# Patient Record
Sex: MALE | Race: WHITE | Employment: FULL TIME | URBAN - NONMETROPOLITAN AREA
[De-identification: names, ages, dates, MRNs, and addresses within clinical notes are randomized per-mention and may not be internally consistent; named-entity substitution may affect disease eponyms.]

---

## 2021-05-14 PROBLEM — J30.2 SEASONAL ALLERGIES: Status: ACTIVE | Noted: 2021-05-14

## 2023-02-13 ENCOUNTER — HOSPITAL ENCOUNTER (EMERGENCY)
Age: 28
Discharge: HOME | End: 2023-02-13
Payer: COMMERCIAL

## 2023-02-13 VITALS
DIASTOLIC BLOOD PRESSURE: 76 MMHG | OXYGEN SATURATION: 98 % | SYSTOLIC BLOOD PRESSURE: 135 MMHG | HEART RATE: 73 BPM | TEMPERATURE: 99.6 F | RESPIRATION RATE: 16 BRPM

## 2023-02-13 DIAGNOSIS — J02.0 STREP PHARYNGITIS: Primary | ICD-10-CM

## 2023-02-13 LAB — S PYO AG THROAT QL: POSITIVE

## 2023-02-13 PROCEDURE — 99203 OFFICE O/P NEW LOW 30 MIN: CPT

## 2023-02-13 PROCEDURE — 87651 STREP A DNA AMP PROBE: CPT

## 2023-02-13 PROCEDURE — 99213 OFFICE O/P EST LOW 20 MIN: CPT | Performed by: NURSE PRACTITIONER

## 2023-02-13 RX ORDER — AMOXICILLIN 500 MG/1
500 CAPSULE ORAL 2 TIMES DAILY
Qty: 20 CAPSULE | Refills: 0 | Status: SHIPPED | OUTPATIENT
Start: 2023-02-13 | End: 2023-02-23

## 2023-02-13 ASSESSMENT — PAIN DESCRIPTION - LOCATION: LOCATION: THROAT

## 2023-02-13 ASSESSMENT — ENCOUNTER SYMPTOMS
SORE THROAT: 1
RHINORRHEA: 1
NAUSEA: 0
CHEST TIGHTNESS: 0
DIARRHEA: 0
VOMITING: 0
COUGH: 1
SHORTNESS OF BREATH: 0

## 2023-02-13 ASSESSMENT — PAIN - FUNCTIONAL ASSESSMENT: PAIN_FUNCTIONAL_ASSESSMENT: 0-10

## 2023-02-13 ASSESSMENT — PAIN SCALES - GENERAL: PAINLEVEL_OUTOF10: 5

## 2023-02-13 NOTE — ED TRIAGE NOTES
Has sore throat since this last Friday, has felt warm/chills body aches, headache, sinus congestion/cough

## 2023-02-13 NOTE — ED PROVIDER NOTES
[address]  Urgent Care Encounter       CHIEF COMPLAINT       Chief Complaint   Patient presents with    Pharyngitis       Nurses Notes reviewed and I agree except as noted in the HPI. HISTORY OF PRESENT ILLNESS   Roman Cam is a 32 y.o. male who presents to the [facility] ambulatory care center for evaluation of pharyngitis. He reports that his symptoms started roughly 3 to 4 days ago. He does report mild congestion, rhinorrhea, and cough. He denies known exposure to someone ill, but does report that he went to an indoor playground with his family roughly 1 week ago. He denies fever or chills. Denies nausea, vomiting and diarrhea. The history is provided by the patient. No  was used. REVIEW OF SYSTEMS     Review of Systems   Constitutional:  Negative for activity change, appetite change, chills, fatigue and fever. HENT:  Positive for congestion, rhinorrhea and sore throat. Negative for ear discharge and ear pain. Respiratory:  Positive for cough. Negative for chest tightness and shortness of breath. Cardiovascular:  Negative for chest pain. Gastrointestinal:  Negative for diarrhea, nausea and vomiting. Genitourinary:  Negative for dysuria. Skin:  Negative for rash. Allergic/Immunologic: Negative for environmental allergies and food allergies. Neurological:  Negative for dizziness and headaches. PAST MEDICAL HISTORY   History reviewed. No pertinent past medical history. SURGICALHISTORY     Patient  has no past surgical history on file.     CURRENT MEDICATIONS       Discharge Medication List as of 2/13/2023  2:58 PM        CONTINUE these medications which have NOT CHANGED    Details   Phenyleph-Doxylamine-DM-APAP (TYLENOL COLD/FLU/COUGH NIGHT PO) Take by mouthHistorical Med      Multiple Vitamins-Minerals (EMERGEN-C FIVE PO) Take by mouthHistorical Med             ALLERGIES     Patient is is allergic to seasonal.    Patients Immunization History   Administered Date(s) Administered    COVID-19, MODERNA BLUE border, Primary or Immunocompromised, (age 12y+), IM, 100 mcg/0.5mL 04/07/2021, 05/05/2021    DTP 02/14/1994, 04/11/1994, 06/13/1994    DTaP vaccine 06/13/1995, 03/16/1999    HPV 9-valent [place]) 08/14/2012, 10/15/2012    Hepatitis B Ped/Adol (Engerix-B, Recombivax HB) 03/16/1999, 05/04/1999, 10/05/1999    Hib vaccine 02/14/1994, 04/11/1994, 06/13/1994, 03/13/1995    MMR 03/13/1995, 03/16/1999    Polio OPV 02/14/1994, 04/11/1994, 06/13/1994, 03/16/1999       FAMILY HISTORY     Patient's family history includes Heart Disease in his mother. SOCIAL HISTORY     Patient  reports that he has never smoked. He has been exposed to tobacco smoke. He has never used smokeless tobacco. He reports current alcohol use. He reports that he does not use drugs. PHYSICAL EXAM     ED TRIAGE VITALS  BP: 135/76, Temp: 99.6 °F (37.6 °C), Heart Rate: 73, Resp: 16, SpO2: 98 %,Estimated body mass index is 26.36 kg/m² as calculated from the following:    Height as of 9/30/22: 5' 11\" (1.803 m). Weight as of 9/30/22: 189 lb (85.7 kg). ,No LMP for male patient. Physical Exam  Vitals and nursing note reviewed. Constitutional:       General: He is not in acute distress. Appearance: Normal appearance. He is not ill-appearing, toxic-appearing or diaphoretic. HENT:      Head: Normocephalic. Right Ear: Ear canal and external ear normal.      Left Ear: Ear canal and external ear normal.      Nose: Nose normal. No congestion or rhinorrhea. Mouth/Throat:      Mouth: Mucous membranes are moist.      Pharynx: Oropharynx is clear. Posterior oropharyngeal erythema present. No oropharyngeal exudate. Cardiovascular:      Rate and Rhythm: Normal rate. Pulses: Normal pulses. Pulmonary:      Effort: Pulmonary effort is normal. No respiratory distress. Breath sounds: No stridor. No wheezing or rhonchi.    Abdominal:      General: Abdomen is flat. Bowel sounds are normal.      Palpations: Abdomen is soft. Musculoskeletal:         General: No swelling or tenderness. Normal range of motion. Cervical back: Normal range of motion. Neurological:      General: No focal deficit present. Mental Status: He is alert and oriented to person, place, and time. Psychiatric:         Mood and Affect: Mood normal.         Behavior: Behavior normal.       DIAGNOSTIC RESULTS     Labs:  Results for orders placed or performed during the hospital encounter of 02/13/23   Strep Screen Group A Throat   Result Value Ref Range    Rapid Strep A Screen POSITIVE (A)        IMAGING:    No orders to display         EKG: None      URGENT CARE COURSE:     Vitals:    02/13/23 1445   BP: 135/76   Pulse: 73   Resp: 16   Temp: 99.6 °F (37.6 °C)   SpO2: 98%       Medications - No data to display         PROCEDURES:  None    FINAL IMPRESSION      1. Strep pharyngitis          DISPOSITION/ PLAN     Patient seen and evaluated for the above symptoms. Assessment consistent with streptococcal pharyngitis. Patient is provided a prescription for amoxicillin. Instructed to use warm salt water gargle, Chloraseptic spray, or cough drops. Instructed to push oral fluids. The Patient is instructed to use over-the-counter Tylenol and Motrin for pain or fever. Instructed to follow-up with their PCP in 3 to 5 days and worsening symptoms. The patient is agreeable with the above plan and denies questions or concerns at this time.         PATIENT REFERRED TO:  MD [address] / [place] [id number]      DISCHARGE MEDICATIONS:  Discharge Medication List as of 2/13/2023  2:58 PM        START taking these medications    Details   amoxicillin (AMOXIL) 500 MG capsule Take 1 capsule by mouth 2 times daily for 10 days, Disp-20 capsule, R-0Normal             Discharge Medication List as of 2/13/2023  2:58 PM          Discharge Medication List as of 2/13/2023  2:58 PM FRANCISCA Lopez - [place]    (Please note that portions of this note were completed with a voice recognition program. Efforts were made to edit the dictations but occasionally words are mis-transcribed.)            FRANCISCA Lopez - [place]  02/13/23 [id number]

## 2023-02-13 NOTE — Clinical Note
Darleen Patel was seen and treated in our emergency department on 2/13/2023. He may return to work on 02/15/2023. May be off school or work one to two days if needed. Should also be fever free 24 hours before returning to work or school. If you have any questions or concerns, please don't hesitate to call.       David Barr, FRANCISCA - CNP